# Patient Record
Sex: MALE | Race: WHITE | URBAN - METROPOLITAN AREA
[De-identification: names, ages, dates, MRNs, and addresses within clinical notes are randomized per-mention and may not be internally consistent; named-entity substitution may affect disease eponyms.]

---

## 2022-09-29 ENCOUNTER — OFFICE VISIT (OUTPATIENT)
Dept: URBAN - METROPOLITAN AREA CLINIC 28 | Facility: CLINIC | Age: 42
End: 2022-09-29
Payer: COMMERCIAL

## 2022-09-29 DIAGNOSIS — H40.41X3: Primary | ICD-10-CM

## 2022-09-29 DIAGNOSIS — H20.021 RECURRENT ACUTE IRIDOCYCLITIS, RIGHT EYE: ICD-10-CM

## 2022-09-29 DIAGNOSIS — H25.13 AGE-RELATED NUCLEAR CATARACT, BILATERAL: ICD-10-CM

## 2022-09-29 PROCEDURE — 76514 ECHO EXAM OF EYE THICKNESS: CPT | Performed by: STUDENT IN AN ORGANIZED HEALTH CARE EDUCATION/TRAINING PROGRAM

## 2022-09-29 PROCEDURE — 92020 GONIOSCOPY: CPT | Performed by: STUDENT IN AN ORGANIZED HEALTH CARE EDUCATION/TRAINING PROGRAM

## 2022-09-29 PROCEDURE — 92133 CPTRZD OPH DX IMG PST SGM ON: CPT | Performed by: STUDENT IN AN ORGANIZED HEALTH CARE EDUCATION/TRAINING PROGRAM

## 2022-09-29 PROCEDURE — 92083 EXTENDED VISUAL FIELD XM: CPT | Performed by: STUDENT IN AN ORGANIZED HEALTH CARE EDUCATION/TRAINING PROGRAM

## 2022-09-29 PROCEDURE — 99204 OFFICE O/P NEW MOD 45 MIN: CPT | Performed by: STUDENT IN AN ORGANIZED HEALTH CARE EDUCATION/TRAINING PROGRAM

## 2022-09-29 RX ORDER — PREDNISOLONE ACETATE 10 MG/ML
1 % SUSPENSION/ DROPS OPHTHALMIC
Qty: 5 | Refills: 2 | Status: ACTIVE
Start: 2022-09-29

## 2022-09-29 RX ORDER — DORZOLAMIDE HYDROCHLORIDE AND TIMOLOL MALEATE 20; 5 MG/ML; MG/ML
SOLUTION/ DROPS OPHTHALMIC
Qty: 10 | Refills: 5 | Status: ACTIVE
Start: 2022-09-29

## 2022-09-29 RX ORDER — BRIMONIDINE TARTRATE 1 MG/ML
0.1 % SOLUTION/ DROPS OPHTHALMIC
Qty: 10 | Refills: 5 | Status: ACTIVE
Start: 2022-09-29

## 2022-09-29 RX ORDER — VALACYCLOVIR 500 MG/1
500 MG TABLET, FILM COATED ORAL
Qty: 60 | Refills: 3 | Status: ACTIVE
Start: 2022-09-29

## 2022-09-29 ASSESSMENT — INTRAOCULAR PRESSURE
OS: 12
OD: 13

## 2022-09-29 NOTE — IMPRESSION/PLAN
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 denies Gonioscopy: open to PTM, 2+ TMP / open to CBB, 2+ TMP 
OCT: 57 diffuse thinning / 74 HVF: OD: MD -24 [place] / OS: MD -2 grossly full C/D: .9/.5 Better seeing eye:  OS
IOP Today: 13/12 Plan: IOP today is great OU. Recommend patient to get blood work to rule out auto immune. Drops: STOP Latanoprost, CONTINUE Alphagan BID OD, and Dorzolamide-Timolol BID OU, Taper Prednisolone TID x1 week, BID x1 week, QD x1 week, then stop. Start Valacyclovir 500 mg BID PO.   
RTC 4-6 weeks IOP / inflammation check Discussed natural history of glaucoma and that irreversible vision loss can occur without adequate IOP control. Emphasized compliance with eyedrops and other treatment described above.

## 2022-09-29 NOTE — IMPRESSION/PLAN
Impression: Age-related nuclear cataract, bilateral: H25.13. Plan: Will continue to monitor. Patient to get cat eval when desires.

## 2022-09-29 NOTE — IMPRESSION/PLAN
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 Plan: Long standing OD (10-15 year) per patient. Patient is on Prednisolone taper at this time.  START Valcyclovir

## 2022-11-17 ENCOUNTER — OFFICE VISIT (OUTPATIENT)
Dept: URBAN - METROPOLITAN AREA CLINIC 28 | Facility: CLINIC | Age: 42
End: 2022-11-17
Payer: COMMERCIAL

## 2022-11-17 DIAGNOSIS — H40.41X3: Primary | ICD-10-CM

## 2022-11-17 PROCEDURE — 99213 OFFICE O/P EST LOW 20 MIN: CPT | Performed by: STUDENT IN AN ORGANIZED HEALTH CARE EDUCATION/TRAINING PROGRAM

## 2022-11-17 ASSESSMENT — INTRAOCULAR PRESSURE
OS: 16
OD: 12

## 2022-11-17 NOTE — IMPRESSION/PLAN
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 denies Gonioscopy: open to PTM, 2+ TMP / open to CBB, 2+ TMP 
OCT: 57 diffuse thinning / 74 HVF: OD: MD -24 [place] / OS: MD -2 grossly full C/D: .9/.5 Better seeing eye:  OS
IOP Today: 12/16 Plan: IOP today is still at target ou. Recommend patient to get blood work to rule out auto immune. Drops: STOP Valacyclovir, CONTINUE Alphagan BID OD, and Dorzolamide-Timolol BID OU. *Explained to patient will restart Valcyclovir and Pred when flares present. Discussed natural history of glaucoma and that irreversible vision loss can occur without adequate IOP control. Emphasized compliance with eyedrops and other treatment described above.

## 2023-10-26 ENCOUNTER — OFFICE VISIT (OUTPATIENT)
Dept: URBAN - METROPOLITAN AREA CLINIC 28 | Facility: CLINIC | Age: 43
End: 2023-10-26
Payer: COMMERCIAL

## 2023-10-26 DIAGNOSIS — H40.41X3 GLAUCOMA SECONDARY TO EYE INFLAMMATION, RIGHT EYE, SEVERE STAGE: Primary | ICD-10-CM

## 2023-10-26 DIAGNOSIS — H40.022 OPEN ANGLE WITH BORDERLINE FINDINGS, HIGH RISK, LEFT EYE: ICD-10-CM

## 2023-10-26 PROCEDURE — 99213 OFFICE O/P EST LOW 20 MIN: CPT | Performed by: STUDENT IN AN ORGANIZED HEALTH CARE EDUCATION/TRAINING PROGRAM

## 2023-10-26 PROCEDURE — 92133 CPTRZD OPH DX IMG PST SGM ON: CPT | Performed by: STUDENT IN AN ORGANIZED HEALTH CARE EDUCATION/TRAINING PROGRAM

## 2023-10-26 ASSESSMENT — INTRAOCULAR PRESSURE
OS: 18
OD: 15